# Patient Record
Sex: FEMALE | Race: BLACK OR AFRICAN AMERICAN | NOT HISPANIC OR LATINO | Employment: OTHER | URBAN - METROPOLITAN AREA
[De-identification: names, ages, dates, MRNs, and addresses within clinical notes are randomized per-mention and may not be internally consistent; named-entity substitution may affect disease eponyms.]

---

## 2017-07-17 ENCOUNTER — HISTORICAL (OUTPATIENT)
Dept: LAB | Facility: HOSPITAL | Age: 52
End: 2017-07-17

## 2017-07-17 LAB
ALBUMIN SERPL-MCNC: 3.8 GM/DL (ref 3.4–5)
ALP SERPL-CCNC: 67 UNIT/L (ref 38–126)
ALT SERPL-CCNC: 16 UNIT/L (ref 12–78)
AST SERPL-CCNC: 9 UNIT/L (ref 15–37)
BILIRUB SERPL-MCNC: 0.5 MG/DL (ref 0.2–1)
BILIRUBIN DIRECT+TOT PNL SERPL-MCNC: 0.1 MG/DL (ref 0–0.5)
BILIRUBIN DIRECT+TOT PNL SERPL-MCNC: 0.4 MG/DL (ref 0–0.8)
CHOLEST SERPL-MCNC: 191 MG/DL (ref 0–200)
CHOLEST/HDLC SERPL: 2.9 {RATIO} (ref 0–4)
HDLC SERPL-MCNC: 66 MG/DL (ref 35–60)
LDLC SERPL CALC-MCNC: 108 MG/DL (ref 0–129)
LIVER PROFILE INTERP: ABNORMAL
PROT SERPL-MCNC: 7.1 GM/DL (ref 6.4–8.2)
TRIGL SERPL-MCNC: 83 MG/DL (ref 30–150)
VLDLC SERPL CALC-MCNC: 17 MG/DL

## 2017-12-07 ENCOUNTER — HISTORICAL (OUTPATIENT)
Dept: ADMINISTRATIVE | Facility: HOSPITAL | Age: 52
End: 2017-12-07

## 2018-02-01 ENCOUNTER — HISTORICAL (OUTPATIENT)
Dept: ADMINISTRATIVE | Facility: HOSPITAL | Age: 53
End: 2018-02-01

## 2018-04-17 ENCOUNTER — HISTORICAL (OUTPATIENT)
Dept: LAB | Facility: HOSPITAL | Age: 53
End: 2018-04-17

## 2018-04-17 LAB
ALBUMIN SERPL-MCNC: 3.9 GM/DL (ref 3.4–5)
ALP SERPL-CCNC: 68 UNIT/L (ref 38–126)
ALT SERPL-CCNC: 22 UNIT/L (ref 12–78)
AST SERPL-CCNC: 14 UNIT/L (ref 15–37)
BILIRUB SERPL-MCNC: 0.4 MG/DL (ref 0.2–1)
BILIRUBIN DIRECT+TOT PNL SERPL-MCNC: 0.1 MG/DL (ref 0–0.2)
BILIRUBIN DIRECT+TOT PNL SERPL-MCNC: 0.3 MG/DL (ref 0–0.8)
CHOLEST SERPL-MCNC: 229 MG/DL (ref 0–200)
CHOLEST/HDLC SERPL: 3.6 {RATIO} (ref 0–4)
HDLC SERPL-MCNC: 63 MG/DL (ref 35–60)
LDLC SERPL CALC-MCNC: 150 MG/DL (ref 0–129)
PROT SERPL-MCNC: 7.7 GM/DL (ref 6.4–8.2)
TRIGL SERPL-MCNC: 78 MG/DL (ref 30–150)
VLDLC SERPL CALC-MCNC: 16 MG/DL

## 2019-08-12 ENCOUNTER — HISTORICAL (OUTPATIENT)
Dept: ADMINISTRATIVE | Facility: HOSPITAL | Age: 54
End: 2019-08-12

## 2019-08-26 LAB
ABS NEUT (OLG): 3.52 X10(3)/MCL (ref 2.1–9.2)
BASOPHILS # BLD AUTO: 0.04 X10(3)/MCL (ref 0–0.2)
BASOPHILS NFR BLD AUTO: 0.7 % (ref 0–1)
BUN SERPL-MCNC: 14 MG/DL (ref 7–18)
CALCIUM SERPL-MCNC: 8.9 MG/DL (ref 8.5–10.1)
CHLORIDE SERPL-SCNC: 109 MMOL/L (ref 98–107)
CO2 SERPL-SCNC: 29 MMOL/L (ref 21–32)
CREAT SERPL-MCNC: 0.73 MG/DL (ref 0.55–1.02)
CREAT/UREA NIT SERPL: 19
EOSINOPHIL # BLD AUTO: 0.05 X10(3)/MCL (ref 0–0.9)
EOSINOPHIL NFR BLD AUTO: 0.9 % (ref 0–6.4)
ERYTHROCYTE [DISTWIDTH] IN BLOOD BY AUTOMATED COUNT: 14.4 % (ref 11.5–17)
GLUCOSE SERPL-MCNC: 88 MG/DL (ref 74–106)
HCT VFR BLD AUTO: 39 % (ref 37–47)
HGB BLD-MCNC: 13 GM/DL (ref 12–16)
IMM GRANULOCYTES # BLD AUTO: 0.02 10*3/UL (ref 0–0.02)
IMM GRANULOCYTES NFR BLD AUTO: 0.4 % (ref 0–0.43)
LYMPHOCYTES # BLD AUTO: 1.47 X10(3)/MCL (ref 0.6–4.6)
LYMPHOCYTES NFR BLD AUTO: 26.2 % (ref 16–44)
MCH RBC QN AUTO: 30 PG (ref 27–31)
MCHC RBC AUTO-ENTMCNC: 33.3 GM/DL (ref 33–36)
MCV RBC AUTO: 89.9 FL (ref 80–94)
MONOCYTES # BLD AUTO: 0.51 X10(3)/MCL (ref 0.1–1.3)
MONOCYTES NFR BLD AUTO: 9.1 % (ref 4–12.1)
NEUTROPHILS # BLD AUTO: 3.52 X10(3)/MCL (ref 2.1–9.2)
NEUTROPHILS NFR BLD AUTO: 62.7 % (ref 43–73)
NRBC BLD AUTO-RTO: 0 % (ref 0–0.2)
PLATELET # BLD AUTO: 221 X10(3)/MCL (ref 130–400)
PMV BLD AUTO: 9.9 FL (ref 7.4–10.4)
POTASSIUM SERPL-SCNC: 4.2 MMOL/L (ref 3.5–5.1)
RBC # BLD AUTO: 4.34 X10(6)/MCL (ref 4.2–5.4)
SODIUM SERPL-SCNC: 143 MMOL/L (ref 136–145)
WBC # SPEC AUTO: 5.6 X10(3)/MCL (ref 4.5–11.5)

## 2019-08-30 ENCOUNTER — HISTORICAL (OUTPATIENT)
Dept: SURGERY | Facility: HOSPITAL | Age: 54
End: 2019-08-30

## 2020-10-20 ENCOUNTER — HISTORICAL (OUTPATIENT)
Dept: LAB | Facility: HOSPITAL | Age: 55
End: 2020-10-20

## 2020-10-20 LAB
ALBUMIN SERPL-MCNC: 4 GM/DL (ref 3.5–5)
ALP SERPL-CCNC: 56 UNIT/L (ref 40–150)
ALT SERPL-CCNC: 17 UNIT/L (ref 0–55)
AST SERPL-CCNC: 17 UNIT/L (ref 5–34)
BILIRUB SERPL-MCNC: 0.8 MG/DL (ref 0.2–1.2)
BILIRUBIN DIRECT+TOT PNL SERPL-MCNC: 0.3 MG/DL (ref 0–0.5)
BILIRUBIN DIRECT+TOT PNL SERPL-MCNC: 0.5 MG/DL (ref 0–0.8)
BUN SERPL-MCNC: 14.3 MG/DL (ref 9.8–20.1)
CALCIUM SERPL-MCNC: 9.6 MG/DL (ref 8.4–10.2)
CHLORIDE SERPL-SCNC: 108 MMOL/L (ref 98–107)
CHOLEST SERPL-MCNC: 186 MG/DL
CHOLEST/HDLC SERPL: 4 {RATIO} (ref 0–5)
CO2 SERPL-SCNC: 28 MMOL/L (ref 22–29)
CREAT SERPL-MCNC: 0.78 MG/DL (ref 0.57–1.11)
CREAT/UREA NIT SERPL: 18
GLUCOSE SERPL-MCNC: 92 MG/DL (ref 74–100)
HDLC SERPL-MCNC: 52 MG/DL (ref 40–60)
LDLC SERPL CALC-MCNC: 113 MG/DL (ref 50–140)
POTASSIUM SERPL-SCNC: 3.7 MMOL/L (ref 3.5–5.1)
PROT SERPL-MCNC: 7.2 GM/DL (ref 6.4–8.3)
SODIUM SERPL-SCNC: 144 MMOL/L (ref 136–145)
TRIGL SERPL-MCNC: 104 MG/DL (ref 0–150)
VLDLC SERPL CALC-MCNC: 21 MG/DL

## 2020-12-14 ENCOUNTER — HISTORICAL (OUTPATIENT)
Dept: ADMINISTRATIVE | Facility: HOSPITAL | Age: 55
End: 2020-12-14

## 2021-09-20 ENCOUNTER — HISTORICAL (OUTPATIENT)
Dept: LAB | Facility: HOSPITAL | Age: 56
End: 2021-09-20

## 2021-09-20 LAB
ALBUMIN SERPL-MCNC: 3.9 GM/DL (ref 3.5–5)
ALP SERPL-CCNC: 55 UNIT/L (ref 40–150)
ALT SERPL-CCNC: 36 UNIT/L (ref 0–55)
AST SERPL-CCNC: 21 UNIT/L (ref 5–34)
BILIRUB SERPL-MCNC: 0.8 MG/DL (ref 0.2–1.2)
BILIRUBIN DIRECT+TOT PNL SERPL-MCNC: 0.3 MG/DL (ref 0–0.5)
BILIRUBIN DIRECT+TOT PNL SERPL-MCNC: 0.5 MG/DL (ref 0–0.8)
CHOLEST SERPL-MCNC: 178 MG/DL
CHOLEST/HDLC SERPL: 4 {RATIO} (ref 0–5)
HDLC SERPL-MCNC: 50 MG/DL (ref 40–60)
LDLC SERPL CALC-MCNC: 108 MG/DL (ref 50–140)
PROT SERPL-MCNC: 7.2 GM/DL (ref 6.4–8.3)
TRIGL SERPL-MCNC: 98 MG/DL (ref 0–150)
VLDLC SERPL CALC-MCNC: 20 MG/DL

## 2022-04-11 ENCOUNTER — HISTORICAL (OUTPATIENT)
Dept: ADMINISTRATIVE | Facility: HOSPITAL | Age: 57
End: 2022-04-11

## 2022-04-27 VITALS
WEIGHT: 176.38 LBS | HEIGHT: 66 IN | SYSTOLIC BLOOD PRESSURE: 153 MMHG | BODY MASS INDEX: 28.34 KG/M2 | DIASTOLIC BLOOD PRESSURE: 87 MMHG

## 2022-04-29 NOTE — OP NOTE
DATE OF SURGERY:    08/30/2019    SURGEON:  Segundo Finch MD    PREOPERATIVE DIAGNOSES:    1. Rotator cuff tear left shoulder.  2. Subacromial impingement.    POSTOPERATIVE DIAGNOSES:    1. Rotator cuff tear left shoulder.  2. Subacromial impingement.    PROCEDURES:    1. Left shoulder arthroscopy with debridement of partial rotator cuff tear.  2. Arthroscopic left shoulder subacromial decompression.    ANESTHESIA:  Laryngeal mask airway.    INTRAVENOUS FLUIDS:  Per Anesthesia.    ESTIMATED BLOOD LOSS:  Less than 10 cc.    COUNTS:  Correct.    COMPLICATIONS:  None, stable to PACU.    INDICATIONS FOR PROCEDURE:  Ms. Machado is a 54-year-old female who has been followed in my clinic.  She has failed multiple conservative treatments for her left shoulder pain.  She had an MRI demonstrating the above findings.  The risks, benefits, and alternatives discussed with the patient and patient's family in detail.  The risks include, but are not limited to, pain, bleeding, infection, damage to blood vessels or nerves, heart attack, stroke, death, need for operation, continued pain, continued loss of motion, need for additional surgery.  Patient understood these risks and wanted to proceed with surgical intervention.  Informed consent was obtained.    PROCEDURE IN DETAIL:  The patient was found in preoperative holding by Anesthesia and found fit for surgery.  She was taken to the operating room and placed on the operating table in supine position, all bony prominences well padded.  Timeout identifying correct patient, correct procedure and correct site, and all were in agreement.  The patient underwent LMA anesthesia without complications.  She was then prepped and draped in normal sterile fashion leaving the left upper extremity exposed for surgery.  She was in the modified beach chair position.  She received preoperative antibiotics.  Next, through the posterior portal with good backflow, a 1 cm incision was made.   Camera was introduced into the glenohumeral joint.  After this was done, an anterior portal was then made through a 1 cm incision just lateral to the tip of the coracoid.  Next, examination of the glenohumeral joint demonstrated a small partial tear of the rotator cuff.  This was appreciated of the supra and infraspinatus tendon.  Did not appreciate it to be full thickness.  The labrum was intact.  She has some mild bicipital tendinitis which underwent a limited debridement with a 3.5 shaver.  The inferior recess was inspected and no loose bodies.  Remaining labrum was intact.  She also had a limited debridement of the rotator cuff tear on the articular surface.  After this was done, attention was turned to the subacromial space.  With the camera introduced into the subacromial space, a lateral portal was then made.  The patient had a large amount of bursal tissue and scar tissue consistent with her previous adhesive capsulitis.  With careful meticulous dissection, the 3.5 shaver was then used to perform a subacromial decompression.  The bursal tissue and scar tissue were removed.  She had much better motion.  There was no tear appreciated, rotator cuff-wise on the bursal surface.  Patient did not require the rotator cuff to be repaired.  After this was done, all fluid was removed.  Her incision was then closed with 2-0 Vicryl and 3-0 Monocryl suture.  Mastisol, Steri-Strips, Xeroform, 4 x 4's, and a shoulder sling were placed to the left upper extremity.  She was awoken by Anesthesia and brought to PACU in stable condition.        ______________________________  MD PAVEL Juarez/MARVIN  DD:  09/05/2019  Time:  08:49AM  DT:  09/05/2019  Time:  09:02AM  Job #:  [id number]

## 2022-05-05 NOTE — HISTORICAL [facility] CERNER
This is a historical note converted from [place]. Formatting and pictures may have been removed.  Please reference [place] for original formatting and attached multimedia. Chief Complaint  left foot pain  History of Present Illness  Patient comes in today complaining of left?pain.? Patient states she her left foot pain has started over the last couple weeks. ?She denies any new injury or specific trauma. ?She has a history of a metatarsal fracture in her left foot.? She states she continues to be?sore?on the?mid lateral aspect of the foot. ?She is tried rest medication shoe inserts without relief. ?She states it is not bad enough for an MRI.  Physical Exam  Vitals & Measurements  HR:?91?(Peripheral)? BP:?136/86?  HT:?167.54?cm? HT:?167.54?cm? WT:?82.64?kg? WT:?82.64?kg? BMI:?29.44?  Left lower extremity compartment soft and warm. ?Skin is intact. ?There is no signs or symptoms of DVT or infection. ?She is tender about the base of the fourth?and third metatarsal. ?She is also tender?slightly lateral to this.? There is no swelling or erythema she is nontender along her fifth MTP?joint. ?She is nontender along the pseudo-Becerra area previously?injured.? She is nontender along the peroneal tendons.? She has 40? of ankle motion she has no pain with subtalar motion she is neurovascular intact distally. ?X-rays 3 views left foot?there is trace talonavicular osteoarthritis, midfoot arthritis.  Assessment/Plan  1.?Osteoarthritis of left midfoot  ? At this time we discussed her physical exam and x-ray findings.? Patient has midfoot arthritis. ?We have also discussed additional?possible soft tissue injury, she does have calcification?of the peroneal tendon?on x-ray.? She would like to hold off on an MRI for further evaluation.? We will start with anti-inflammatories with appropriate precautions and physical therapy. ?We have discussed appropriate shoewear. ?I would like to see her back in 4 weeks to see how she is  progressing.  Ordered:  meloxicam, 15 mg = 1 tab(s), Oral, Daily, # 30 tab(s), 0 Refill(s), Pharmacy: [facility] Follow up, *Est. 01/04/18 3:00:00 CST, Order for future visit, Osteoarthritis of left midfoot  Sprain of left foot, [facility] [place]  Office/Outpatient Visit Level 3 Established [id number] PC, Osteoarthritis of left midfoot  Sprain of left foot, [facility] [place], 12/07/17 9:18:00 CST  PT/OT External Referral, 12/07/17 9:18:00 CST, Osteoarthritis of left midfoot  Sprain of left foot, Evaluate and Treat, 3 X Week, Patient has IV, Standard Precautions  ?  2.?Sprain of left foot  Ordered:  meloxicam, 15 mg = 1 tab(s), Oral, Daily, # 30 tab(s), 0 Refill(s), Pharmacy: [facility] Follow up, *Est. 01/04/18 3:00:00 CST, Order for future visit, Osteoarthritis of left midfoot  Sprain of left foot, [facility] [place]  Office/Outpatient Visit Level 3 Established [id number] PC, Osteoarthritis of left midfoot  Sprain of left foot, [facility] [place], 12/07/17 9:18:00 CST  PT/OT External Referral, 12/07/17 9:18:00 CST, Osteoarthritis of left midfoot  Sprain of left foot, Evaluate and Treat, 3 X Week, Patient has IV, Standard Precautions  ?  Fracture of fifth metatarsal bone of left foot  ?   Problem List/Past Medical History  Ongoing  Cervical sprain  Foot sprain  Fracture of fifth metatarsal bone  Pain of left foot  Sprain of right shoulder  Historical  Able to lie down  Able to lie down  Acid reflux  Anxiety  ET - Exercise tolerance  Exercise tolerance  HT - Hypertension  Hypercholesterolemia  Hypoglycemia  Osteoporosis  PAC - Premature atrial contraction  Rotator cuff tear  Wears glasses  Procedure/Surgical History  Arthroscopy of Shoulder with Rotator Cuff Repair (Right) (12/22/2015)  Arthroscopy, shoulder, surgical; debridement, limited (12/22/2015)  Arthroscopy, shoulder, surgical; decompression of subacromial space with partial acromioplasty,  with coracoacromial ligament (ie, arch) release, when performed (List separately in addition to code for primary procedure) (12/22/2015)  Excision of Right Shoulder Joint, Percutaneous Endoscopic Approach (12/22/2015)  Release Right Shoulder Bursa and Ligament, Percutaneous Endoscopic Approach (12/22/2015)  Open reduction of fracture of tibia and fibula with internal fixation (08/15/2014)  Open treatment of distal fibular fracture (lateral malleolus), includes internal fixation, when performed. (08/15/2014)  ORIF Ankle (Right) (08/15/2014)  Biopsy of breast  Cyst  Dilation and curettage  eye surgery  History of back surgery.  Hysterectomy  Medications  ALENDRONATE SODIUM 70 MG TAB, 70 mg= 1 tab(s), Oral, qWeek  AMOXICILLIN 875 MG TABLET, 875 mg= 1 tab(s), Oral, BID  DESLORATADINE 5 MG TABLET, 5 mg= 1 tab(s), Oral, Daily  DILTIAZEM 24HR  MG CAP, 120 mg= 1 cap(s), Oral, Daily  DILTIAZEM 24HR  MG CAP, 240 mg= 1 cap(s), Oral, Daily  FLUTICASONE PROP 50 MCG SPRAY, 1 spray(s), Nasal, Daily  LEVOTHYROXINE 100 MCG TABLET, 100 mcg= 1 tab(s), Oral, Daily  LEVOTHYROXINE 88 MCG TABLET, 88 mcg= 1 tab(s), Oral, Daily  Mobic 15 mg oral tablet, 15 mg= 1 tab(s), Oral, Daily  Norco 5 mg-325 mg oral tablet, 1 tab(s), Oral, q6hr, PRN,? ?Not taking  OMEPRAZOLE DR 40 MG CAPSULE, 40 mg= 1 cap(s), Oral, Daily  TRAMADOL HCL 50 MG TABLET, 50 mg= 1 tab(s), Oral, q4hr  VALSARTAN 80 MG TABLET, 80 mg= 1 tab(s), Oral, Daily  Allergies  No Known Medication Allergies  Social History  Alcohol - 07/23/2016  Current, Wine, 1-2 times per week  Employment/School - 07/23/2016  Employed, Highest education level: High school.  Substance Abuse - Denies Substance Abuse, 07/23/2016  Never  Tobacco - Denies Tobacco Use, 07/23/2016  Never smoker  Family History  Diabetes mellitus type 2: Mother.  Hypertension.: Father.  Primary malignant neoplasm of prostate: Father.

## 2022-05-05 NOTE — HISTORICAL [facility] C[facility]
This is a historical note converted from [place]. Formatting and pictures may have been removed.  Please reference [place] for original formatting and attached multimedia. Chief Complaint  left shoulder pain for approx 5-6 months.  History of Present Illness  Patient comes in today complaining of left shoulder pain. ?She is right-hand dominant. ?Patient states she has been having worsening pain and loss of motion for the last 6 months in her left shoulder. ?She is unsure of any specific trauma. ?She has been treated by her pain management doctor. ?Patient states she has had injections as well as?physical therapy without relief. ?She continues to have pain and loss of motion of her left shoulder especially with overhead activities and any type of lifting.? She denies any numbness or tingling she denies other complaints.  Physical Exam  Vitals & Measurements  HR:?84(Peripheral)? BP:?151/96?  HT:?167?cm? WT:?80?kg? BMI:?28.69?  Left upper extremity form soft and warm. ?Skin is intact with no signs or symptoms of DVT or infection. ?She is point tender along the anterolateral aspect the left shoulder positive Cid positive empty can sign questionable drop arm. ?She is able to forward flex and abduct 90 degrees with pain and discomfort?questionable frozen shoulder.? She has a questionable OBriens. ?Negative sulcus. ?She is neurovascular intact distally.? X-rays 3 views left shoulder demonstrate no obvious fracture dislocation.  Assessment/Plan  1.?Impingement syndrome, shoulder, left?M75.42  ?At this time we discussed her physical exam and x-ray findings. ?We have discussed her shoulder impingement, suspected rotator cuff tear,?possible frozen shoulder. ?She has failed more conservative treatments including physical therapy for the last 6 months. ?She is also had a injection medication. ?We will proceed with an MRI of her left shoulder. ?I would like to see her back next week with her results.? She will continue her  shoulder range of motion and strengthening exercises.  Ordered:  Clinic Follow up, *Est. 08/19/19 3:00:00 CDT, Order for future visit, Impingement syndrome, shoulder, left  Left rotator cuff tear, [facility]  MRI Ext Upper Joint Left W/O Contrast, Routine, 08/12/19 8:41:00 CDT, Other (please specify), None, Ambulatory, Patient Has IV?, mri left shoulder, [facility], Rad Type, Order for future visit, Impingement syndrome, shoulder, left  Left rotator cuff tear, Schedule this test, Outside Facility, 08...  Office/Outpatient Visit Level 3 Established [id number] PC, Impingement syndrome, shoulder, left  Left rotator cuff tear, [facility], 08/12/19 8:40:00 CDT  ?  2.?Left rotator cuff tear?M75.102  Ordered:  Clinic Follow up, *Est. 08/19/19 3:00:00 CDT, Order for future visit, Impingement syndrome, shoulder, left  Left rotator cuff tear, [facility]  MRI Ext Upper Joint Left W/O Contrast, Routine, 08/12/19 8:41:00 CDT, Other (please specify), None, Ambulatory, Patient Has IV?, mri left shoulder, [facility], Rad Type, Order for future visit, Impingement syndrome, shoulder, left  Left rotator cuff tear, Schedule this test, Outside Facility, 08...  Office/Outpatient Visit Level 3 Established [id number] PC, Impingement syndrome, shoulder, left  Left rotator cuff tear, [facility], 08/12/19 8:40:00 CDT  ?  Referrals  Clinic Follow up, *Est. 08/19/19 3:00:00 CDT, Order for future visit, Impingement syndrome, shoulder, left  Left rotator cuff tear, [facility]   Problem List/Past Medical History  Ongoing  Cervical sprain  Foot sprain  Fracture of fifth metatarsal bone  Pain of left foot  Sprain of right shoulder  Historical  Able to lie down  Able to lie down  Acid reflux  Anxiety  ET - Exercise tolerance  Exercise tolerance  HT - Hypertension  Hypercholesterolemia  Hypoglycemia  Osteoporosis  PAC - Premature atrial contraction  Rotator cuff tear  Wears glasses  Procedure/Surgical History  Arthroscopy of Shoulder  with Rotator Cuff Repair (Right) (12/22/2015)  Arthroscopy, shoulder, surgical; debridement, limited (12/22/2015)  Arthroscopy, shoulder, surgical; decompression of subacromial space with partial acromioplasty, with coracoacromial ligament (ie, arch) release, when performed (List separately in addition to code for primary procedure) (12/22/2015)  Excision of Right Shoulder Joint, Percutaneous Endoscopic Approach (12/22/2015)  Release Right Shoulder Bursa and Ligament, Percutaneous Endoscopic Approach (12/22/2015)  Open reduction of fracture of tibia and fibula with internal fixation (08/15/2014)  Open treatment of distal fibular fracture (lateral malleolus), includes internal fixation, when performed. (08/15/2014)  ORIF Ankle (Right) (08/15/2014)  Biopsy of breast  Cyst  Dilation and curettage  eye surgery  History of back surgery  Hysterectomy   Medications  ALENDRONATE SODIUM 70 MG TAB, 70 mg= 1 tab(s), Oral, qWeek  AMOXICILLIN 875 MG TABLET, 875 mg= 1 tab(s), Oral, BID,? ?Not Taking, Completed Rx  aspirin 81 mg oral tablet, 81 mg= 1 tab(s), Oral, Daily  DESLORATADINE 5 MG TABLET, 5 mg= 1 tab(s), Oral, Daily  DILTIAZEM 24HR  MG CAP, 120 mg= 1 cap(s), Oral, Daily,? ?Not taking  DILTIAZEM 24HR  MG CAP, 240 mg= 1 cap(s), Oral, Daily,? ?Not taking  FLUTICASONE PROP 50 MCG SPRAY, 1 spray(s), Nasal, Daily  LEVOTHYROXINE 100 MCG TABLET, 100 mcg= 1 tab(s), Oral, Daily  LEVOTHYROXINE 88 MCG TABLET, 88 mcg= 1 tab(s), Oral, Daily  Mobic 15 mg oral tablet, 15 mg= 1 tab(s), Oral, Daily  Norco 5 mg-325 mg oral tablet, 1 tab(s), Oral, q6hr, PRN,? ?Not Taking, Completed Rx  OMEPRAZOLE DR 40 MG CAPSULE, 40 mg= 1 cap(s), Oral, Daily  TRAMADOL HCL 50 MG TABLET, 50 mg= 1 tab(s), Oral, q4hr  VALSARTAN 80 MG TABLET, 80 mg= 1 tab(s), Oral, Daily  Allergies  No Known Medication Allergies  Social History  Abuse/Neglect  No, 08/12/2019  Alcohol  Current, Wine, 1-2 times per week, 08/14/2014  Employment/School  Employed, Highest  education level: High school., 08/14/2014  Substance Use - Denies Substance Abuse, 08/14/2014  Never, 07/23/2016  Tobacco - Denies Tobacco Use, 08/14/2014  Never smoker, N/A, 12/24/2018  Never smoker, 07/23/2016  Family History  Diabetes mellitus type 2: Mother.  Hypertension.: Father.  Primary malignant neoplasm of prostate: Father.  Immunizations  Vaccine Date Status   tetanus toxoid 08/07/2014 Given   Health Maintenance  Health Maintenance  ???Pending?(in the next year)  ??? ??OverDue  ??? ? ? ?Diabetes Screening due??and every?  ??? ? ? ?Colorectal Screening due??06/14/18??and every 1??year(s)  ??? ? ? ?Alcohol Misuse Screening due??01/01/19??and every 1??year(s)  ??? ? ? ?Depression Screening due??02/01/19??and every 1??year(s)  ??? ? ? ?Aspirin Therapy for CVD Prevention due??02/01/19??and every 1??year(s)  ??? ? ? ?Breast Cancer Screening due??06/07/19??and every 2??year(s)  ??? ??Due?  ??? ? ? ?ADL Screening due??08/12/19??and every 1??year(s)  ??? ? ? ?Influenza Vaccine due??08/12/19??and every?  ??? ??Due In Future?  ??? ? ? ?Obesity Screening not due until??01/01/20??and every 1??year(s)  ??? ? ? ?Hypertension Management-BMP not due until??01/18/20??and every 1??year(s)  ??? ? ? ?Blood Pressure Screening not due until??02/12/20??and every 1??year(s)  ??? ? ? ?Body Mass Index Check not due until??02/12/20??and every 1??year(s)  ??? ? ? ?Hypertension Management-Blood Pressure not due until??02/12/20??and every 1??year(s)  ???Satisfied?(in the past 1 year)  ??? ??Satisfied?  ??? ? ? ?Blood Pressure Screening on??08/12/19.??Satisfied by Delilah Machado LPN.  ??? ? ? ?Body Mass Index Check on??08/12/19.??Satisfied by Delilah Machado LPN.  ??? ? ? ?Cervical Cancer Screening on??06/26/19.??Satisfied by Destiney Oliva  ??? ? ? ?Diabetes Screening on??12/24/18.??Satisfied by Demario Darling  ??? ? ? ?Hypertension Management-Blood Pressure on??08/12/19.??Satisfied by Delilah Machado LPN  ??? ? ? ?Lipid  Screening on??01/18/19.??Satisfied by Raffaele Reynoso  ??? ? ? ?Obesity Screening on??08/12/19.??Satisfied by Delilah Machado LPN  ?

## 2022-05-05 NOTE — HISTORICAL [facility]ER
This is a historical note converted from [place]. Formatting and pictures may have been removed.  Please reference [place] for original formatting and attached multimedia. Chief Complaint  right lower leg pain for approx 1 month. no injury. today pain 2/10 averages 7/10 no prior sx ref from dr. macedo  History of Present Illness  Patient comes in today complaining of?right knee pain for last 4 weeks.? Patient states in the?back lateral part of her leg she has been having some pain,,?goes up and down?around her knee. ?She is tried some rest medication without relief.? She is also seeing her vascular doctor for this as well.  Physical Exam  Vitals & Measurements  T:?36.5? ?C (Oral)? HR:?109(Peripheral)? BP:?153/87?  HT:?167.00?cm? WT:?80.000?kg? BMI:?28.69?  Right lower extremity form soft and warm. ?Skin is intact with no signs symptoms of DVT or infection. ?Examination of the right knee?there is no effusion there is no swelling. ?She does have a mildly positive patella grind. ?She is tender along the distal IT band. ?She is also tender along her hamstring, she is point tender?at the?insertion area. ?Her motion 0 to 120 degrees she is otherwise stable to stressing negative McMurrays she is neurovascular tact distally.? X-rays?3 views of the right knee demonstrate some mild underlying?arthritic changes  Assessment/Plan  1.?Right hamstring muscle strain?S76.311A  ?At this time we discussed her physical exam and x-ray findings. ?Patient is mainly symptomatic about her?hamstring area. ?We have discussed appropriate stretching, low impact activities anti-inflammatories with appropriate precautions. ?We have all discussed formal therapy.? I would like to see her back in 4 weeks to see how she is?progressing.  Ordered:  meloxicam, 15 mg = 1 tab(s), Oral, Daily, # 30 tab(s), 0 Refill(s), Pharmacy: [facility], 167, cm, Height/Length Dosing, 12/14/20 8:17:00 CST, 80, kg, Weight Dosing, 12/14/20 8:17:00  CST  Clinic Follow up, *Est. 01/25/21 3:00:00 CST, Order for future visit, Right hamstring muscle strain  IT band syndrome  Osteoarthritis of right knee, [facility]  Office/Outpatient Visit Level 4 Established [id number] PC, Right hamstring muscle strain  IT band syndrome  Osteoarthritis of right knee, [facility], 12/14/20 8:37:00 CST  ?  2.?IT band syndrome?M76.30  Ordered:  meloxicam, 15 mg = 1 tab(s), Oral, Daily, # 30 tab(s), 0 Refill(s), Pharmacy: [facility], 167, cm, Height/Length Dosing, 12/14/20 8:17:00 CST, 80, kg, Weight Dosing, 12/14/20 8:17:00 CST  Clinic Follow up, *Est. 01/25/21 3:00:00 CST, Order for future visit, Right hamstring muscle strain  IT band syndrome  Osteoarthritis of right knee, [facility]  Office/Outpatient Visit Level 4 Established [id number] PC, Right hamstring muscle strain  IT band syndrome  Osteoarthritis of right knee, [facility], 12/14/20 8:37:00 CST  ?  3.?Osteoarthritis of right knee?M17.11  Ordered:  meloxicam, 15 mg = 1 tab(s), Oral, Daily, # 30 tab(s), 0 Refill(s), [facility], 167, cm, Height/Length Dosing, 12/14/20 8:17:00 CST, 80, kg, Weight Dosing, 12/14/20 8:17:00 CST  Clinic Follow up, *Est. 01/25/21 3:00:00 CST, Order for future visit, Right hamstring muscle strain  IT band syndrome  Osteoarthritis of right knee, Orth[facility]  Office/Outpatient Visit Level 4 Established [id number] PC, Right hamstring muscle strain  IT band syndrome  Osteoarthritis of right knee, [facility], 12/14/20 8:37:00 CST  ?  Referrals  Clinic Follow up, *Est. 01/25/21 3:00:00 CST, Order for future visit, Right hamstring muscle strain  IT band syndrome  Osteoarthritis of right knee, Orthopa[facility]ics   Problem List/Past Medical History  Ongoing  2019-nCoV  Cervical sprain  Foot sprain  Fracture of fifth metatarsal bone  Pain of left foot  Sprain of right shoulder  Historical  Able to lie down  Able to lie  down  Acid reflux  Anxiety  ET - Exercise tolerance  Exercise tolerance  HT - Hypertension  Hypercholesterolemia  Hypoglycemia  Osteoporosis  PAC - Premature atrial contraction  Rotator cuff tear  Wears glasses  Procedure/Surgical History  Arthroscopy of Shoulder with Rotator Cuff Repair (Left) (08/30/2019)  Arthroscopy, shoulder, surgical; debridement, limited (08/30/2019)  Injection, anesthetic agent; suprascapular nerve (08/30/2019)  Introduction of Anesthetic Agent into Peripheral Nerves and Plexi, Percutaneous Approach (08/30/2019)  Release Left Shoulder Joint, Percutaneous Endoscopic Approach (08/30/2019)  Arthroscopy of Shoulder with Rotator Cuff Repair (Right) (12/22/2015)  Arthroscopy, shoulder, surgical; debridement, limited (12/22/2015)  Arthroscopy, shoulder, surgical; decompression of subacromial space with partial acromioplasty, with coracoacromial ligament (ie, arch) release, when performed (List separately in addition to code for primary procedure) (12/22/2015)  Excision of Right Shoulder Joint, Percutaneous Endoscopic Approach (12/22/2015)  Release Right Shoulder Bursa and Ligament, Percutaneous Endoscopic Approach (12/22/2015)  Open reduction of fracture of tibia and fibula with internal fixation (08/15/2014)  Open treatment of distal fibular fracture (lateral malleolus), includes internal fixation, when performed. (08/15/2014)  ORIF Ankle (Right) (08/15/2014)  Biopsy of breast  Cyst  Dilation and curettage  eye surgery  History of back surgery  Hysterectomy   Medications  acetaminophen-hydrocodone 325 mg-7.5 mg oral tablet, 1 tab(s), Oral, q6hr  ALENDRONATE SODIUM 70 MG TAB, 70 mg= 1 tab(s), Oral, qWeek  Alphagan P 0.1% ophthalmic solution, 1 drop(s), Eye-Both, q8hr  alPRAzolam 0.5 mg oral tablet, 0.5 mg= 1 tab(s), Oral, q4-6hr  AMOXICILLIN 875 MG TABLET, 875 mg= 1 tab(s), Oral, BID,? ?Not Taking, Completed Rx  aspirin 81 mg oral tablet, 81 mg= 1 tab(s), Oral, Daily,? ?Not taking  azelastine 137  mcg/inh (0.1%) nasal spray, 2 spray(s), Both Nostrils, BID  calcium carbonate 600 mg oral tablet, 600 mg= 1 tab(s), Oral, Daily  DESLORATADINE 5 MG TABLET, 5 mg= 1 tab(s), Oral, Daily  DILTIAZEM 24HR  MG CAP, 120 mg= 1 cap(s), Oral, Daily,? ?Not taking  DILTIAZEM 24HR  MG CAP, 240 mg= 1 cap(s), Oral, Daily,? ?Not taking  Duexis 800 mg-26.6 mg oral tablet, 1 tab(s), Oral, TID,? ?Not taking  Edarbi 40 mg oral tablet, 20 mg= 0.5 tab(s), Oral, BID  Fish Oil oral capsule, 1 cap(s), Oral, Daily  Flax Seed Oil oral capsule, 1 cap(s), Oral, Daily  FLUTICASONE PROP 50 MCG SPRAY, 1 spray(s), Nasal, Daily  LEVOTHYROXINE 88 MCG TABLET, 88 mcg= 1 tab(s), Oral, Daily  Mobic 15 mg oral tablet, 15 mg= 1 tab(s), Oral, Daily  Mobic 15 mg oral tablet, 15 mg= 1 tab(s), Oral, Daily,? ?Not taking  mupirocin 2% topical ointment, 1 hao, TOP, TID,? ?Not Taking, Completed Rx  Norco 5 mg-325 mg oral tablet, 1 tab(s), Oral, q6hr, PRN,? ?Not taking: pt not sure, no bottles at preop visit  [place] 5 mg-325 mg oral tablet, 1 tab(s), Oral, q6hr, PRN  OMEPRAZOLE DR 40 MG CAPSULE, 40 mg= 1 cap(s), Oral, Daily,? ?Not taking  One A Day Women 50 Plus oral tablet, 1 tab(s), Oral, Daily  pravastatin 10 mg oral tablet, 10 mg= 1 tab(s), Oral, qPM,? ?Not taking  pravastatin 20 mg oral tablet, 20 mg= 1 tab(s), Oral, qPM  ranitidine 300 mg oral tablet, 300 mg= 1 tab(s), Oral, qAM,? ?Not taking  TRAMADOL HCL 50 MG TABLET, 50 mg= 1 tab(s), Oral, q4hr,? ?Not taking  VALSARTAN 80 MG TABLET, 80 mg= 1 tab(s), Oral, Daily,? ?Not taking  Vitamin B12 500 mcg oral tablet, 500 mcg= 1 tab(s), Oral, Daily  Vitamin B6 100 mg oral tablet, 100 mg= 1 tab(s), Oral, Daily  Vitamin D3 400 intl units oral capsule, 400 IntUnit= 1 cap(s), Oral, Daily,? ?Not taking  vitamin E 400 intl units oral capsule, 400 IntUnit= 1 cap(s), Oral, Daily  Allergies  No Known Medication Allergies  Social History  Abuse/Neglect  No, No, Yes, 12/14/2020  Alcohol  Current, Wine, 1-2 times per  week, 08/14/2014  Employment/School  Retired, 08/26/2019  Employed, Highest education level: High school., 08/14/2014  Home/Environment  Lives with Spouse., 08/26/2019  Nutrition/Health  Regular, 08/26/2019  Spiritual/Cultural  Caodaism, 08/26/2019  Substance Use - Denies Substance Abuse, 08/14/2014  Never, 07/23/2016  Tobacco - Denies Tobacco Use, 08/14/2014  Former smoker, quit more than 30 days ago, N/A, 12/14/2020  Family History  Diabetes mellitus type 2: Mother.  Hypertension.: Father.  Primary malignant neoplasm of prostate: Father.  Immunizations  Vaccine Date Status   tetanus toxoid 08/07/2014 Given   Health Maintenance  Health Maintenance  ???Pending?(in the next year)  ??? ??OverDue  ??? ? ? ?Colorectal Screening due??06/14/18??and every 1??year(s)  ??? ? ? ?Depression Screening due??02/01/19??and every 1??year(s)  ??? ? ? ?Aspirin Therapy for CVD Prevention due??02/01/19??and every 1??year(s)  ??? ? ? ?Breast Cancer Screening due??06/07/19??and every 2??year(s)  ??? ? ? ?Alcohol Misuse Screening due??01/02/20??and every 1??year(s)  ??? ? ? ?Influenza Vaccine due??10/01/20??and every 1??day(s)  ??? ? ? ?Hypertension Management-Blood Pressure due??12/08/20??and every 1??year(s)  ??? ??Due?  ??? ? ? ?Blood Pressure Screening due??12/08/20??and every 1??year(s)  ??? ? ? ?Body Mass Index Check due??12/08/20??and every 1??year(s)  ??? ? ? ?ADL Screening due??12/14/20??and every 1??year(s)  ??? ? ? ?Zoster Vaccine due??12/14/20??Unknown Frequency  ??? ??Due In Future?  ??? ? ? ?Obesity Screening not due until??01/01/21??and every 1??year(s)  ??? ? ? ?Hypertension Management-BMP not due until??10/21/21??and every 1??year(s)  ???Satisfied?(in the past 1 year)  ??? ??Satisfied?  ??? ? ? ?Blood Pressure Screening on??12/14/20.??Satisfied by Delilah Machado LPN  ??? ? ? ?Body Mass Index Check on??12/14/20.??Satisfied by Delilah Machado LPN  ??? ? ? ?Diabetes Screening on??10/21/20.??Satisfied by Mustapha  Paz  ??? ? ? ?Hypertension Management-Blood Pressure on??12/14/20.??Satisfied by Delilah Machado LPN  ??? ? ? ?Lipid Screening on??10/20/20.??Satisfied by Guillermo Keller  ??? ? ? ?Obesity Screening on??12/14/20.??Satisfied by Delilah Machado LPN  ?

## 2022-05-05 NOTE — HISTORICAL [facility] CERNER
This is a historical note converted from [place]. Formatting and pictures may have been removed.  Please reference [place] for original formatting and attached multimedia. Chief Complaint  LEFT FOOT PAIN STARTED ON MOBIC LAST SEEN 12/8/17. SHE STATES SHE STILL HAS SOME PAIN.  History of Present Illness  Patient returns today for repeat exam. ?Patient states she was not able to go to physical therapy, it was not approved. ?She does have a history?of a left fifth metatarsal base fracture?which has healed. ?She has been seen?at the beginning of last December, underlying midfoot arthritis, not related to her previous fracture,?and had physical therapy recommended. ?She continues to have some soreness more on the lateral part of her foot. ?She denies any pain about her hindfoot or midfoot area.? She states she does fine with daily activities though the more she is on it the more she feels?discomfort laterally. ?She denies any new trauma she denies any complaints. ?She is occasionally taking anti-inflammatories.  Physical Exam  Vitals & Measurements  BP:?130/88?  HT:?167.54?cm? HT:?167.54?cm? WT:?82.64?kg? WT:?82.64?kg? BMI:?29.44?  Left lower extremity compartment soft and warm. ?Skin is intact. ?There is no signs or symptoms of DVT or infection.? She does have soreness along the fifth metatarsal shaft area. ?She is nontender about the base itself. ?She is nontender along the peroneal tendon area as well. ?She has no soreness about her midfoot. ?She has 40? of ankle motion she is stable to stressing she walks with a normal gait she is neurovascular intact distally there is no bruising or swelling or erythema.? X-rays 3 views left foot demonstrate a healed fifth metatarsal base fracture,?calcific?tendinitis  Assessment/Plan  1.?Fracture of fifth metatarsal bone  ? At this time we discussed her physical exam and x-ray findings. ?I do not believe her midfoot arthritis it is associated with her fifth metatarsal?healed  fracture. ?We have discussed her underlying?arthritis as well we have discussed appropriate shoewear as well as physical therapy to regain her strength and endurance.? I would like to see her back in 6 weeks to see how she is progressing. ?We have also discussed anti-inflammatories with appropriate precautions.  Ordered:  Clinic Follow up, *Est. 03/15/18 8:15:00 CDT, Order for future visit, Fracture of fifth metatarsal bone, [facility]  Office/Outpatient Visit Level 3 Established [id number] PC, Fracture of fifth metatarsal bone  Calcific tendonitis, [facility], 02/01/18 8:48:00 CST  PT/OT External Referral, 02/01/18 8:51:00 CST, Fracture of fifth metatarsal bone  Calcific tendonitis, Evaluate and Treat, 3 X Week, Patient has IV, Standard Precautions  ?  2.?Calcific tendonitis  Ordered:  Clinic Follow up, *Est. 03/15/18 8:15:00 CDT, Order for future visit, Fracture of fifth metatarsal bone, [facility]  Office/Outpatient Visit Level 3 Established [id number] PC, Fracture of fifth metatarsal bone  Calcific tendonitis, [facility], 02/01/18 8:48:00 CST  PT/OT External Referral, 02/01/18 8:51:00 CST, Fracture of fifth metatarsal bone  Calcific tendonitis, Evaluate and Treat, 3 X Week, Patient has IV, Standard Precautions  ?  Left foot pain  ?   Problem List/Past Medical History  Ongoing  Cervical sprain  Foot sprain  Fracture of fifth metatarsal bone  Pain of left foot  Sprain of right shoulder  Historical  Able to lie down  Able to lie down  Acid reflux  Anxiety  ET - Exercise tolerance  Exercise tolerance  HT - Hypertension  Hypercholesterolemia  Hypoglycemia  Osteoporosis  PAC - Premature atrial contraction  Rotator cuff tear  Wears glasses  Procedure/Surgical History  Arthroscopy of Shoulder with Rotator Cuff Repair (Right) (12/22/2015)  Arthroscopy, shoulder, surgical; debridement, limited (12/22/2015)  Arthroscopy, shoulder, surgical; decompression of subacromial space with partial  acromioplasty, with coracoacromial ligament (ie, arch) release, when performed (List separately in addition to code for primary procedure) (12/22/2015)  Excision of Right Shoulder Joint, Percutaneous Endoscopic Approach (12/22/2015)  Release Right Shoulder Bursa and Ligament, Percutaneous Endoscopic Approach (12/22/2015)  Open reduction of fracture of tibia and fibula with internal fixation (08/15/2014)  Open treatment of distal fibular fracture (lateral malleolus), includes internal fixation, when performed. (08/15/2014)  ORIF Ankle (Right) (08/15/2014)  Biopsy of breast  Cyst  Dilation and curettage  eye surgery  History of back surgery.  Hysterectomy  Medications  ALENDRONATE SODIUM 70 MG TAB, 70 mg= 1 tab(s), Oral, qWeek  AMOXICILLIN 875 MG TABLET, 875 mg= 1 tab(s), Oral, BID  aspirin 81 mg oral tablet, 81 mg= 1 tab(s), Oral, Daily  DESLORATADINE 5 MG TABLET, 5 mg= 1 tab(s), Oral, Daily  DILTIAZEM 24HR  MG CAP, 120 mg= 1 cap(s), Oral, Daily  DILTIAZEM 24HR  MG CAP, 240 mg= 1 cap(s), Oral, Daily  FLUTICASONE PROP 50 MCG SPRAY, 1 spray(s), Nasal, Daily  LEVOTHYROXINE 100 MCG TABLET, 100 mcg= 1 tab(s), Oral, Daily  LEVOTHYROXINE 88 MCG TABLET, 88 mcg= 1 tab(s), Oral, Daily  Mobic 15 mg oral tablet, 15 mg= 1 tab(s), Oral, Daily  Norco 5 mg-325 mg oral tablet, 1 tab(s), Oral, q6hr, PRN  OMEPRAZOLE DR 40 MG CAPSULE, 40 mg= 1 cap(s), Oral, Daily  TRAMADOL HCL 50 MG TABLET, 50 mg= 1 tab(s), Oral, q4hr  VALSARTAN 80 MG TABLET, 80 mg= 1 tab(s), Oral, Daily  Allergies  No Known Medication Allergies  Social History  Alcohol - 07/23/2016  Current, Wine, 1-2 times per week  Employment/School - 07/23/2016  Employed, Highest education level: High school.  Substance Abuse - Denies Substance Abuse, 07/23/2016  Never  Tobacco - Denies Tobacco Use, 07/23/2016  Never smoker  Family History  Diabetes mellitus type 2: Mother.  Hypertension.: Father.  Primary malignant neoplasm of prostate: Father.

## 2022-06-27 ENCOUNTER — LAB VISIT (OUTPATIENT)
Dept: LAB | Facility: HOSPITAL | Age: 57
End: 2022-06-27
Attending: INTERNAL MEDICINE
Payer: MEDICARE

## 2022-06-27 DIAGNOSIS — E78.2 MIXED HYPERLIPIDEMIA: ICD-10-CM

## 2022-06-27 DIAGNOSIS — I10 ESSENTIAL HYPERTENSION, MALIGNANT: Primary | ICD-10-CM

## 2022-06-27 LAB
ALBUMIN SERPL-MCNC: 3.9 GM/DL (ref 3.5–5)
ALP SERPL-CCNC: 63 UNIT/L (ref 40–150)
ALT SERPL-CCNC: 33 UNIT/L (ref 0–55)
AST SERPL-CCNC: 29 UNIT/L (ref 5–34)
BILIRUBIN DIRECT+TOT PNL SERPL-MCNC: 0.2 MG/DL (ref 0–0.5)
BILIRUBIN DIRECT+TOT PNL SERPL-MCNC: 0.3 MG/DL (ref 0–0.8)
BILIRUBIN DIRECT+TOT PNL SERPL-MCNC: 0.5 MG/DL
CHOLEST SERPL-MCNC: 170 MG/DL
CHOLEST/HDLC SERPL: 3 {RATIO} (ref 0–5)
HDLC SERPL-MCNC: 53 MG/DL (ref 35–60)
LDLC SERPL CALC-MCNC: 102 MG/DL (ref 50–140)
PROT SERPL-MCNC: 7.1 GM/DL (ref 6.4–8.3)
TRIGL SERPL-MCNC: 73 MG/DL (ref 37–140)
VLDLC SERPL CALC-MCNC: 15 MG/DL

## 2022-06-27 PROCEDURE — 36415 COLL VENOUS BLD VENIPUNCTURE: CPT

## 2022-06-27 PROCEDURE — 80076 HEPATIC FUNCTION PANEL: CPT

## 2022-06-27 PROCEDURE — 80061 LIPID PANEL: CPT

## 2023-05-01 ENCOUNTER — LAB VISIT (OUTPATIENT)
Dept: LAB | Facility: HOSPITAL | Age: 58
End: 2023-05-01
Attending: INTERNAL MEDICINE
Payer: MEDICARE

## 2023-05-01 DIAGNOSIS — I10 ESSENTIAL HYPERTENSION, MALIGNANT: Primary | ICD-10-CM

## 2023-05-01 DIAGNOSIS — E78.2 MIXED HYPERLIPIDEMIA: ICD-10-CM

## 2023-05-01 LAB
ALBUMIN SERPL-MCNC: 3.7 G/DL (ref 3.5–5)
ALP SERPL-CCNC: 60 UNIT/L (ref 40–150)
ALT SERPL-CCNC: 17 UNIT/L (ref 0–55)
AST SERPL-CCNC: 18 UNIT/L (ref 5–34)
BILIRUBIN DIRECT+TOT PNL SERPL-MCNC: 0.2 MG/DL (ref 0–?)
BILIRUBIN DIRECT+TOT PNL SERPL-MCNC: 0.3 MG/DL (ref 0–0.8)
BILIRUBIN DIRECT+TOT PNL SERPL-MCNC: 0.5 MG/DL
CHOLEST SERPL-MCNC: 201 MG/DL
CHOLEST/HDLC SERPL: 4 {RATIO} (ref 0–5)
HDLC SERPL-MCNC: 47 MG/DL (ref 35–60)
LDLC SERPL CALC-MCNC: 135 MG/DL (ref 50–140)
PROT SERPL-MCNC: 6.8 GM/DL (ref 6.4–8.3)
TRIGL SERPL-MCNC: 96 MG/DL (ref 37–140)
VLDLC SERPL CALC-MCNC: 19 MG/DL

## 2023-05-01 PROCEDURE — 80076 HEPATIC FUNCTION PANEL: CPT

## 2023-05-01 PROCEDURE — 36415 COLL VENOUS BLD VENIPUNCTURE: CPT

## 2023-05-01 PROCEDURE — 80061 LIPID PANEL: CPT

## 2024-02-12 ENCOUNTER — LAB VISIT (OUTPATIENT)
Dept: LAB | Facility: HOSPITAL | Age: 59
End: 2024-02-12
Attending: INTERNAL MEDICINE
Payer: MEDICARE

## 2024-02-12 DIAGNOSIS — I10 ESSENTIAL HYPERTENSION, MALIGNANT: ICD-10-CM

## 2024-02-12 DIAGNOSIS — E78.2 MIXED HYPERLIPIDEMIA: Primary | ICD-10-CM

## 2024-02-12 LAB
ALBUMIN SERPL-MCNC: 3.9 G/DL (ref 3.4–4.8)
ALP SERPL-CCNC: 65 UNIT/L (ref 40–150)
ALT SERPL-CCNC: 14 UNIT/L (ref 0–55)
AST SERPL-CCNC: 18 UNIT/L (ref 5–34)
BILIRUB SERPL-MCNC: 0.7 MG/DL
BILIRUBIN DIRECT+TOT PNL SERPL-MCNC: 0.2 MG/DL (ref 0–?)
BILIRUBIN DIRECT+TOT PNL SERPL-MCNC: 0.5 MG/DL (ref 0–0.8)
CHOLEST SERPL-MCNC: 180 MG/DL
CHOLEST/HDLC SERPL: 3 {RATIO} (ref 0–5)
HDLC SERPL-MCNC: 60 MG/DL (ref 35–60)
LDLC SERPL CALC-MCNC: 109 MG/DL (ref 50–140)
PROT SERPL-MCNC: 7.3 GM/DL (ref 5.8–7.6)
TRIGL SERPL-MCNC: 56 MG/DL (ref 37–140)
VLDLC SERPL CALC-MCNC: 11 MG/DL

## 2024-02-12 PROCEDURE — 80076 HEPATIC FUNCTION PANEL: CPT

## 2024-02-12 PROCEDURE — 80061 LIPID PANEL: CPT

## 2024-02-12 PROCEDURE — 36415 COLL VENOUS BLD VENIPUNCTURE: CPT

## 2025-02-17 ENCOUNTER — LAB VISIT (OUTPATIENT)
Dept: LAB | Facility: HOSPITAL | Age: 60
End: 2025-02-17
Attending: INTERNAL MEDICINE
Payer: MEDICARE

## 2025-02-17 DIAGNOSIS — E78.2 MIXED HYPERLIPIDEMIA: ICD-10-CM

## 2025-02-17 DIAGNOSIS — I10 HYPERTENSION, UNSPECIFIED TYPE: Primary | ICD-10-CM

## 2025-02-17 LAB
ALBUMIN SERPL-MCNC: 4 G/DL (ref 3.4–4.8)
ALP SERPL-CCNC: 63 UNIT/L (ref 40–150)
ALT SERPL-CCNC: 11 UNIT/L (ref 0–55)
AST SERPL-CCNC: 18 UNIT/L (ref 5–34)
BILIRUB DIRECT SERPL-MCNC: 0.2 MG/DL (ref 0–?)
BILIRUB INDIRECT SERPL-MCNC: 0.6 MG/DL (ref 0–0.8)
BILIRUB SERPL-MCNC: 0.8 MG/DL
CHOLEST SERPL-MCNC: 167 MG/DL
CHOLEST/HDLC SERPL: 3 {RATIO} (ref 0–5)
HDLC SERPL-MCNC: 51 MG/DL (ref 35–60)
LDLC SERPL CALC-MCNC: 100 MG/DL (ref 50–140)
PROT SERPL-MCNC: 7.3 GM/DL (ref 5.8–7.6)
TRIGL SERPL-MCNC: 79 MG/DL (ref 37–140)
VLDLC SERPL CALC-MCNC: 16 MG/DL

## 2025-02-17 PROCEDURE — 36415 COLL VENOUS BLD VENIPUNCTURE: CPT

## 2025-02-17 PROCEDURE — 80061 LIPID PANEL: CPT

## 2025-02-17 PROCEDURE — 80076 HEPATIC FUNCTION PANEL: CPT
